# Patient Record
Sex: FEMALE | Race: WHITE | NOT HISPANIC OR LATINO
[De-identification: names, ages, dates, MRNs, and addresses within clinical notes are randomized per-mention and may not be internally consistent; named-entity substitution may affect disease eponyms.]

---

## 2019-04-05 ENCOUNTER — APPOINTMENT (OUTPATIENT)
Dept: OBGYN | Facility: CLINIC | Age: 29
End: 2019-04-05
Payer: COMMERCIAL

## 2019-04-05 VITALS
WEIGHT: 140 LBS | DIASTOLIC BLOOD PRESSURE: 70 MMHG | BODY MASS INDEX: 23.9 KG/M2 | SYSTOLIC BLOOD PRESSURE: 120 MMHG | HEIGHT: 64 IN

## 2019-04-05 DIAGNOSIS — Z80.3 FAMILY HISTORY OF MALIGNANT NEOPLASM OF BREAST: ICD-10-CM

## 2019-04-05 DIAGNOSIS — Z00.00 ENCOUNTER FOR GENERAL ADULT MEDICAL EXAMINATION W/OUT ABNORMAL FINDINGS: ICD-10-CM

## 2019-04-05 DIAGNOSIS — Z82.49 FAMILY HISTORY OF ISCHEMIC HEART DISEASE AND OTHER DISEASES OF THE CIRCULATORY SYSTEM: ICD-10-CM

## 2019-04-05 DIAGNOSIS — K63.9 DISEASE OF INTESTINE, UNSPECIFIED: ICD-10-CM

## 2019-04-05 DIAGNOSIS — Z78.9 OTHER SPECIFIED HEALTH STATUS: ICD-10-CM

## 2019-04-05 DIAGNOSIS — Z87.09 PERSONAL HISTORY OF OTHER DISEASES OF THE RESPIRATORY SYSTEM: ICD-10-CM

## 2019-04-05 PROCEDURE — 99385 PREV VISIT NEW AGE 18-39: CPT

## 2019-04-05 PROCEDURE — 99213 OFFICE O/P EST LOW 20 MIN: CPT | Mod: 25

## 2019-04-05 NOTE — PHYSICAL EXAM
[Awake] : awake [Alert] : alert [Acute Distress] : no acute distress [LAD] : no lymphadenopathy [Thyroid Nodule] : no thyroid nodule [Goiter] : no goiter [Mass] : no breast mass [Nipple Discharge] : no nipple discharge [Axillary LAD] : no axillary lymphadenopathy [Soft] : soft [Tender] : non tender [Distended] : not distended [Oriented x3] : oriented to person, place, and time [Depressed Mood] : not depressed [Flat Affect] : affect not flat [RRR, No Murmurs] : RRR, no murmurs [CTAB] : CTAB

## 2019-04-08 ENCOUNTER — LABORATORY RESULT (OUTPATIENT)
Age: 29
End: 2019-04-08

## 2019-05-08 ENCOUNTER — APPOINTMENT (OUTPATIENT)
Dept: OBGYN | Facility: CLINIC | Age: 29
End: 2019-05-08

## 2019-09-10 ENCOUNTER — ASOB RESULT (OUTPATIENT)
Age: 29
End: 2019-09-10

## 2019-09-10 ENCOUNTER — APPOINTMENT (OUTPATIENT)
Dept: OBGYN | Facility: CLINIC | Age: 29
End: 2019-09-10
Payer: COMMERCIAL

## 2019-09-10 VITALS
WEIGHT: 140 LBS | SYSTOLIC BLOOD PRESSURE: 120 MMHG | HEIGHT: 64 IN | DIASTOLIC BLOOD PRESSURE: 60 MMHG | BODY MASS INDEX: 23.9 KG/M2

## 2019-09-10 PROCEDURE — ZZZZZ: CPT

## 2019-09-10 PROCEDURE — 99213 OFFICE O/P EST LOW 20 MIN: CPT

## 2019-09-10 PROCEDURE — 76830 TRANSVAGINAL US NON-OB: CPT

## 2019-09-10 PROCEDURE — 76856 US EXAM PELVIC COMPLETE: CPT | Mod: 59

## 2019-09-12 NOTE — DISCUSSION/SUMMARY
[FreeTextEntry1] : female infertility. dw pt rba clomid incl 15 % risk twins. had us in office which showed subserosal fibroid 3+ cm at fundus. otherwise wnl. \par will try clomid, fu 1 mo.

## 2019-09-13 RX ORDER — CLOMIPHENE CITRATE 50 MG/1
50 TABLET ORAL DAILY
Qty: 5 | Refills: 0 | Status: ACTIVE | COMMUNITY
Start: 2019-09-13 | End: 1900-01-01

## 2019-09-16 LAB — CYTOLOGY CVX/VAG DOC THIN PREP: NORMAL

## 2020-09-10 ENCOUNTER — APPOINTMENT (OUTPATIENT)
Dept: OBGYN | Facility: CLINIC | Age: 30
End: 2020-09-10
Payer: COMMERCIAL

## 2020-09-10 VITALS
WEIGHT: 150 LBS | DIASTOLIC BLOOD PRESSURE: 71 MMHG | HEIGHT: 64 IN | SYSTOLIC BLOOD PRESSURE: 106 MMHG | BODY MASS INDEX: 25.61 KG/M2

## 2020-09-10 PROCEDURE — 99213 OFFICE O/P EST LOW 20 MIN: CPT

## 2020-09-29 ENCOUNTER — LABORATORY RESULT (OUTPATIENT)
Age: 30
End: 2020-09-29

## 2020-10-07 ENCOUNTER — ASOB RESULT (OUTPATIENT)
Age: 30
End: 2020-10-07

## 2020-10-07 ENCOUNTER — APPOINTMENT (OUTPATIENT)
Dept: OBGYN | Facility: CLINIC | Age: 30
End: 2020-10-07
Payer: COMMERCIAL

## 2020-10-07 ENCOUNTER — APPOINTMENT (OUTPATIENT)
Dept: OBGYN | Facility: CLINIC | Age: 30
End: 2020-10-07

## 2020-10-07 PROCEDURE — 76856 US EXAM PELVIC COMPLETE: CPT | Mod: 59

## 2020-10-07 PROCEDURE — 76830 TRANSVAGINAL US NON-OB: CPT

## 2020-10-26 ENCOUNTER — APPOINTMENT (OUTPATIENT)
Dept: OBGYN | Facility: CLINIC | Age: 30
End: 2020-10-26
Payer: COMMERCIAL

## 2020-10-26 VITALS
WEIGHT: 154 LBS | HEART RATE: 90 BPM | HEIGHT: 64 IN | SYSTOLIC BLOOD PRESSURE: 116 MMHG | DIASTOLIC BLOOD PRESSURE: 75 MMHG | BODY MASS INDEX: 26.29 KG/M2

## 2020-10-26 PROCEDURE — 99213 OFFICE O/P EST LOW 20 MIN: CPT | Mod: 25

## 2020-10-26 PROCEDURE — 99072 ADDL SUPL MATRL&STAF TM PHE: CPT

## 2020-10-26 PROCEDURE — 99395 PREV VISIT EST AGE 18-39: CPT

## 2020-10-26 NOTE — PHYSICAL EXAM
[Appropriately responsive] : appropriately responsive [Alert] : alert [No Acute Distress] : no acute distress [No Lymphadenopathy] : no lymphadenopathy [Regular Rate Rhythm] : regular rate rhythm [No Murmurs] : no murmurs [Clear to Auscultation B/L] : clear to auscultation bilaterally [Soft] : soft [Non-tender] : non-tender [Non-distended] : non-distended [No HSM] : No HSM [No Lesions] : no lesions [No Mass] : no mass [Oriented x3] : oriented x3 [Examination Of The Breasts] : a normal appearance [No Masses] : no breast masses were palpable [Labia Majora] : normal [Labia Minora] : normal [Normal] : uterus [No Bleeding] : there was no active vaginal bleeding [Enlarged ___ wks] : enlarged [unfilled] ~Uweeks [Uterine Adnexae] : were not tender and not enlarged

## 2020-10-28 LAB — HPV HIGH+LOW RISK DNA PNL CVX: NOT DETECTED

## 2020-10-29 LAB — CYTOLOGY CVX/VAG DOC THIN PREP: NORMAL

## 2021-06-01 ENCOUNTER — EMERGENCY (EMERGENCY)
Facility: HOSPITAL | Age: 31
LOS: 1 days | Discharge: HOME | End: 2021-06-01
Attending: EMERGENCY MEDICINE
Payer: COMMERCIAL

## 2021-06-01 VITALS
TEMPERATURE: 98 F | RESPIRATION RATE: 16 BRPM | OXYGEN SATURATION: 98 % | SYSTOLIC BLOOD PRESSURE: 125 MMHG | HEIGHT: 66 IN | HEART RATE: 73 BPM | WEIGHT: 149.91 LBS | DIASTOLIC BLOOD PRESSURE: 80 MMHG

## 2021-06-01 LAB — HCG UR QL: NEGATIVE — SIGNIFICANT CHANGE UP

## 2021-06-01 PROCEDURE — 81025 URINE PREGNANCY TEST: CPT

## 2021-06-01 PROCEDURE — 70450 CT HEAD/BRAIN W/O DYE: CPT | Mod: 26

## 2021-06-01 PROCEDURE — 99284 EMERGENCY DEPT VISIT MOD MDM: CPT | Mod: 25

## 2021-06-01 PROCEDURE — 72125 CT NECK SPINE W/O DYE: CPT

## 2021-06-01 PROCEDURE — 99284 EMERGENCY DEPT VISIT MOD MDM: CPT

## 2021-06-01 PROCEDURE — 70450 CT HEAD/BRAIN W/O DYE: CPT

## 2021-06-01 PROCEDURE — 72125 CT NECK SPINE W/O DYE: CPT | Mod: 26

## 2021-06-01 PROCEDURE — 96372 THER/PROPH/DIAG INJ SC/IM: CPT

## 2021-06-01 RX ORDER — METHOCARBAMOL 500 MG/1
2 TABLET, FILM COATED ORAL
Qty: 42 | Refills: 0
Start: 2021-06-01 | End: 2021-06-07

## 2021-06-01 RX ORDER — METHOCARBAMOL 500 MG/1
1500 TABLET, FILM COATED ORAL ONCE
Refills: 0 | Status: COMPLETED | OUTPATIENT
Start: 2021-06-01 | End: 2021-06-01

## 2021-06-01 RX ORDER — LIDOCAINE 4 G/100G
1 CREAM TOPICAL ONCE
Refills: 0 | Status: COMPLETED | OUTPATIENT
Start: 2021-06-01 | End: 2021-06-01

## 2021-06-01 RX ORDER — IBUPROFEN 200 MG
1 TABLET ORAL
Qty: 28 | Refills: 0
Start: 2021-06-01 | End: 2021-06-07

## 2021-06-01 RX ORDER — KETOROLAC TROMETHAMINE 30 MG/ML
30 SYRINGE (ML) INJECTION ONCE
Refills: 0 | Status: DISCONTINUED | OUTPATIENT
Start: 2021-06-01 | End: 2021-06-01

## 2021-06-01 RX ADMIN — LIDOCAINE 1 PATCH: 4 CREAM TOPICAL at 10:31

## 2021-06-01 RX ADMIN — METHOCARBAMOL 1500 MILLIGRAM(S): 500 TABLET, FILM COATED ORAL at 10:30

## 2021-06-01 RX ADMIN — Medication 30 MILLIGRAM(S): at 10:30

## 2021-06-01 NOTE — ED PROVIDER NOTE - PATIENT PORTAL LINK FT
You can access the FollowMyHealth Patient Portal offered by [facility] by registering at the following website: [email or web address]. By joining [employer]’s FollowMyHealth portal, you will also be able to view your health information using other applications (apps) compatible with our system.

## 2021-06-01 NOTE — ED PROVIDER NOTE - CARE PROVIDER_API CALL
Mukesh [place] (DO)  Orthopaedic Surgery  [address] [place] [address], [address]  [place] [address]  Phone: [phone]  Fax: [phone]  Follow Up Time:

## 2021-06-01 NOTE — ED ADULT TRIAGE NOTE - CHIEF COMPLAINT QUOTE
pt c/o neck and upper back pain nontraumatic   occasional radiation to right arm with feelings of tingling.

## 2021-06-01 NOTE — ED PROVIDER NOTE - OBJECTIVE STATEMENT
31 year old female with no significant PMh presents with neck pain., pt states that her pain started 2 days ago with a R sided neck pain, Pain is constant, radiates down the R arm. She states that this morning she had numbness and tingling in the RUE but that has resolved. She reports associated posterior throbbing headache. no blurry vision, numbness, tingling, weakness, chest pain, SOB, fevers. Denies trauma.

## 2021-12-17 ENCOUNTER — TRANSCRIPTION ENCOUNTER (OUTPATIENT)
Age: 31
End: 2021-12-17

## 2022-03-29 PROBLEM — Z78.9 OTHER SPECIFIED HEALTH STATUS: Chronic | Status: ACTIVE | Noted: 2021-06-01

## 2022-04-04 ENCOUNTER — APPOINTMENT (OUTPATIENT)
Dept: OBGYN | Facility: CLINIC | Age: 32
End: 2022-04-04
Payer: COMMERCIAL

## 2022-04-04 VITALS
BODY MASS INDEX: 26.29 KG/M2 | WEIGHT: 154 LBS | DIASTOLIC BLOOD PRESSURE: 70 MMHG | HEIGHT: 64 IN | SYSTOLIC BLOOD PRESSURE: 110 MMHG

## 2022-04-04 PROCEDURE — 99213 OFFICE O/P EST LOW 20 MIN: CPT | Mod: 25

## 2022-04-04 PROCEDURE — 99395 PREV VISIT EST AGE 18-39: CPT

## 2022-04-04 PROCEDURE — 99072 ADDL SUPL MATRL&STAF TM PHE: CPT

## 2022-04-06 LAB — HPV HIGH+LOW RISK DNA PNL CVX: NOT DETECTED

## 2022-04-11 LAB — CYTOLOGY CVX/VAG DOC THIN PREP: NORMAL

## 2022-05-09 ENCOUNTER — APPOINTMENT (OUTPATIENT)
Dept: OBGYN | Facility: CLINIC | Age: 32
End: 2022-05-09
Payer: COMMERCIAL

## 2022-05-09 ENCOUNTER — APPOINTMENT (OUTPATIENT)
Dept: ANTEPARTUM | Facility: CLINIC | Age: 32
End: 2022-05-09
Payer: COMMERCIAL

## 2022-05-09 ENCOUNTER — ASOB RESULT (OUTPATIENT)
Age: 32
End: 2022-05-09

## 2022-05-09 VITALS — WEIGHT: 154 LBS | BODY MASS INDEX: 26.29 KG/M2 | HEIGHT: 64 IN

## 2022-05-09 DIAGNOSIS — N97.9 FEMALE INFERTILITY, UNSPECIFIED: ICD-10-CM

## 2022-05-09 PROCEDURE — 99072 ADDL SUPL MATRL&STAF TM PHE: CPT

## 2022-05-09 PROCEDURE — 76830 TRANSVAGINAL US NON-OB: CPT

## 2022-05-09 PROCEDURE — 99213 OFFICE O/P EST LOW 20 MIN: CPT

## 2022-05-09 PROCEDURE — 76856 US EXAM PELVIC COMPLETE: CPT | Mod: 59

## 2022-05-09 NOTE — HISTORY OF PRESENT ILLNESS
[FreeTextEntry1] : 31-year-old white female para zero here for follow up visit. Patient has been trying for several years without success to get pregnant. In 2018 the patient had normal HSG normal semen analysis normal TSH FSH LH and serum anti muellerian hormone level's. She was seen by Dr. Ledesma of [facility] and had 2 cycles of letrozole with IUI without success. She stopped secondary to insurance issues.\par \par Patient had a known 4 cm posterior myoma and she is now here for followup visit for sonogram and discussion. Ultrasound shows a 7.8 x 4.7 cm uterus with a 6.9 cm posterior subserosal myoma. Both adnexa are normal. The endometrial lining is 20.9 mm.

## 2022-05-09 NOTE — DISCUSSION/SUMMARY
[FreeTextEntry1] : I discussed the findings of the sonogram with the patient. I discussed that location of fibroids is very important and subserosal fibroids in general are less of an issue in terms of bleeding, pain as well as infertility.\par \par Patient has not followed up for repeat blood work. Once this is back she would like to try another course of left result although she has had 2 courses of letrozole with IUI. We will await the results of the blood work to proceed.

## 2022-05-16 ENCOUNTER — NON-APPOINTMENT (OUTPATIENT)
Age: 32
End: 2022-05-16

## 2022-07-08 ENCOUNTER — LABORATORY RESULT (OUTPATIENT)
Age: 32
End: 2022-07-08

## 2022-08-03 ENCOUNTER — APPOINTMENT (OUTPATIENT)
Dept: OBGYN | Facility: CLINIC | Age: 32
End: 2022-08-03

## 2022-08-03 VITALS
DIASTOLIC BLOOD PRESSURE: 70 MMHG | BODY MASS INDEX: 26.8 KG/M2 | HEIGHT: 64 IN | SYSTOLIC BLOOD PRESSURE: 106 MMHG | WEIGHT: 157 LBS

## 2022-08-03 DIAGNOSIS — N94.10 UNSPECIFIED DYSPAREUNIA: ICD-10-CM

## 2022-08-03 PROCEDURE — 99214 OFFICE O/P EST MOD 30 MIN: CPT

## 2022-08-03 PROCEDURE — 99072 ADDL SUPL MATRL&STAF TM PHE: CPT

## 2022-08-03 NOTE — HISTORY OF PRESENT ILLNESS
[FreeTextEntry1] : 32-year-old white female  0 here for follow-up visit with complaint of pelvic pain and dyspareunia as well as some postcoital bleeding.  Patient has had normal Pap smears.  She has a known fibroid uterus with a 6 to 7 cm subserosal myoma.  She reports persistent pain with intercourse.  She also sometimes reports pain otherwise although denies any GI or  symptoms.\par \par Patient has a history of infertility and recently has had normal FSH estradiol and anti-müllerian hormone levels.  She has had 2 trials of letrozole and IUI with Dr. Ledesma in the past without success.

## 2022-08-03 NOTE — DISCUSSION/SUMMARY
[FreeTextEntry1] : I discussed the clinical situation in terms of her dyspareunia at length.  She will return for repeat sonogram although it is possible that her symptoms are secondary to her fibroid.  We discussed the option of considering a myomectomy once we have performed the sonogram.\par \par Regarding her postcoital bleeding I discussed possibility of HPV being an etiology.  Patient is HPV high-risk negative but may still be HPV low risk positive.  We discussed considering some TCA to the transformation zone and endocervical canal.  Patient will consider this and we will possibly apply when she returns for sonogram.

## 2022-08-24 ENCOUNTER — ASOB RESULT (OUTPATIENT)
Age: 32
End: 2022-08-24

## 2022-08-24 ENCOUNTER — APPOINTMENT (OUTPATIENT)
Dept: ANTEPARTUM | Facility: CLINIC | Age: 32
End: 2022-08-24

## 2022-08-24 ENCOUNTER — APPOINTMENT (OUTPATIENT)
Dept: OBGYN | Facility: CLINIC | Age: 32
End: 2022-08-24

## 2022-08-24 VITALS
DIASTOLIC BLOOD PRESSURE: 70 MMHG | SYSTOLIC BLOOD PRESSURE: 110 MMHG | HEIGHT: 64 IN | WEIGHT: 157 LBS | BODY MASS INDEX: 26.8 KG/M2

## 2022-08-24 DIAGNOSIS — N93.0 POSTCOITAL AND CONTACT BLEEDING: ICD-10-CM

## 2022-08-24 DIAGNOSIS — D25.9 LEIOMYOMA OF UTERUS, UNSPECIFIED: ICD-10-CM

## 2022-08-24 PROCEDURE — 99214 OFFICE O/P EST MOD 30 MIN: CPT

## 2022-08-24 PROCEDURE — 99072 ADDL SUPL MATRL&STAF TM PHE: CPT

## 2022-08-24 PROCEDURE — 76830 TRANSVAGINAL US NON-OB: CPT

## 2022-08-24 PROCEDURE — 76856 US EXAM PELVIC COMPLETE: CPT | Mod: 59

## 2022-08-24 RX ORDER — MISOPROSTOL 200 UG/1
200 TABLET ORAL
Qty: 2 | Refills: 0 | Status: ACTIVE | COMMUNITY
Start: 2022-08-24 | End: 1900-01-01

## 2022-08-24 NOTE — HISTORY OF PRESENT ILLNESS
[FreeTextEntry1] : This is a 32-year-old white female  0 here for follow-up visit for postcoital and intermenstrual bleeding.  She has a 6-7 cm subserosal myoma and has been seen and evaluated by [facility].  She says she had an HSG about 4 years ago.  She has had normal FSH and serum anti-müllerian hormone levels and 2 trials of letrozole without success.\par \par She is also here regarding her postcoital bleeding.  Patient's Pap smears have been normal.\par \par Ultrasound today reveals a 7.7 x 6 cm uterus with a 6.7 cm subserosal myoma.  Her endometrial thickness is 14.98.  She has normal adnexa other than the left-sided 1.6 cm corpus luteal cyst.

## 2022-08-24 NOTE — DISCUSSION/SUMMARY
[FreeTextEntry1] : I discussed the findings of the sonogram at length with the patient.  I discussed in general a subserosal myoma should not interfere with her getting pregnant.  I am obtaining another HSG.  Patient is going to be following up with Dr. Lopez of [facility].  I discussed her endometrial thickness as being somewhat thickened although I discussed that it is harder to interpret in premenopausal patient.\par \par Regarding her intermenstrual bleeding and slightly thickened endometrium patient will return for office hysteroscopy and endometrial biopsy.  All her questions were answered.\par \par Regarding her postcoital bleeding I again discussed possibility of it being secondary to low risk HPV although we do not have a culture suggesting that.  We discussed considering TCA use and patient is amicable to this.  Risks and benefits were discussed including some pain and TCA was applied to transformation zone and endocervical canal.

## 2022-10-10 ENCOUNTER — APPOINTMENT (OUTPATIENT)
Dept: OBGYN | Facility: CLINIC | Age: 32
End: 2022-10-10

## 2022-10-10 ENCOUNTER — RESULT CHARGE (OUTPATIENT)
Age: 32
End: 2022-10-10

## 2022-10-10 VITALS
SYSTOLIC BLOOD PRESSURE: 110 MMHG | DIASTOLIC BLOOD PRESSURE: 70 MMHG | HEIGHT: 64 IN | WEIGHT: 157 LBS | BODY MASS INDEX: 26.8 KG/M2

## 2022-10-10 DIAGNOSIS — N92.3 OVULATION BLEEDING: ICD-10-CM

## 2022-10-10 PROCEDURE — 99072 ADDL SUPL MATRL&STAF TM PHE: CPT

## 2022-10-10 PROCEDURE — 58558Z: CUSTOM

## 2022-10-10 PROCEDURE — 81025 URINE PREGNANCY TEST: CPT

## 2022-10-10 NOTE — PROCEDURE
[Endometrial Biopsy] : Endometrial biopsy [Irregular Bleeding] : irregular uterine bleeding [Tenaculum] : Tenaculum [Required Dilation] : required dilation [Sounded to ___ cm] : sounded to [unfilled] ~Ucm [Scant] : scant [No Complications] : No complications [Hysteroscopy] : Hysteroscopy [Abnormal uterine bleeding] : abnormal uterine bleeding [Risks] : risks [Benefits] : benefits [Infection] : infection [Bleeding] : bleeding [Pretreatment with misoprostol] : pretreatment with misoprostol [Lidocaine___ mL] : [unfilled] ~UmL of lidocaine [flexible] : Using aseptic technique a hysteroscopy was performed using a flexible hysteroscope [Sent to Pathology] : specimen was placed in buffered formalin and sent for pathology [Tolerated Well] : Patient tolerated the procedure well [de-identified] : no evidence of submucosal myomas or polyps

## 2022-10-10 NOTE — HISTORY OF PRESENT ILLNESS
[FreeTextEntry1] : 32-year-old white female G0 with history of postcoital and intermenstrual bleeding and known 6 to 7 cm subserosal myoma presents for office hysteroscopy with endometrial biopsy.  Endometrial thickness was 14.98 mm.

## 2022-10-10 NOTE — DISCUSSION/SUMMARY
[FreeTextEntry1] : I discussed the findings of the office hysteroscopy and endometrial biopsy with the patient we will await the results.  I did discuss that patient is going to follow-up with [facility] in terms of her substantial fibroid although it appears to be subserosal in nature.  All her questions were answered.

## 2022-10-11 LAB — HCG UR QL: NEGATIVE

## 2022-10-13 LAB — CORE LAB BIOPSY: NORMAL

## 2024-03-07 ENCOUNTER — APPOINTMENT (OUTPATIENT)
Dept: OBGYN | Facility: CLINIC | Age: 34
End: 2024-03-07
Payer: COMMERCIAL

## 2024-03-07 VITALS
BODY MASS INDEX: 25.61 KG/M2 | HEIGHT: 64 IN | WEIGHT: 150 LBS | DIASTOLIC BLOOD PRESSURE: 75 MMHG | SYSTOLIC BLOOD PRESSURE: 114 MMHG

## 2024-03-07 DIAGNOSIS — Z01.419 ENCOUNTER FOR GYNECOLOGICAL EXAMINATION (GENERAL) (ROUTINE) W/OUT ABNORMAL FINDINGS: ICD-10-CM

## 2024-03-07 PROCEDURE — 99395 PREV VISIT EST AGE 18-39: CPT

## 2024-03-07 NOTE — DISCUSSION/SUMMARY
[FreeTextEntry1] : Pap smear is performed.  Patient is going to follow-up with [facility] in 2 months to commence trying to get pregnant.  Because of a history of fibroids and recent myomectomy she will return for sonogram.  We also discussed genetic testing because of her maternal grandmother with premenopausal breast cancer.

## 2024-03-07 NOTE — PHYSICAL EXAM
[Chaperone Present] : A chaperone was present in the examining room during all aspects of the physical examination [FreeTextEntry1] : nayana cabrera [Appropriately responsive] : appropriately responsive [Alert] : alert [No Acute Distress] : no acute distress [No Lymphadenopathy] : no lymphadenopathy [Regular Rate Rhythm] : regular rate rhythm [Clear to Auscultation B/L] : clear to auscultation bilaterally [No Murmurs] : no murmurs [Soft] : soft [Non-tender] : non-tender [Non-distended] : non-distended [No HSM] : No HSM [No Lesions] : no lesions [Oriented x3] : oriented x3 [No Mass] : no mass [Examination Of The Breasts] : a normal appearance [No Masses] : no breast masses were palpable [Labia Majora] : normal [Labia Minora] : normal [Normal] : normal [Uterine Adnexae] : normal

## 2024-03-07 NOTE — HISTORY OF PRESENT ILLNESS
[FreeTextEntry1] : 33-year-old white female G0 here for annual visit.  She is sexually active with her  and is trying to get pregnant.  Is followed by reproductive endocrine in [place].    Patient did have a history of a fibroid uterus with a 7 cm fibroid that she just had removed at [facility].  It was removed robotically.  The surgeon advised her to wait 6 months prior to pregnancy.  She was also advised that she had severe cervical stenosis because they were not able to perform chromotubation at the time of the surgery.  Otherwise she has a medical history of asthma, surgical history of rhinoplasty as well as myomectomy.  She is .  Denies tobacco or drug use but drinks occasionally.  Patient is a surgical tech at [employer].  She has a family history of premenopausal breast cancer in maternal grandmother

## 2024-03-10 LAB — HPV HIGH+LOW RISK DNA PNL CVX: NOT DETECTED

## 2024-03-12 LAB — CYTOLOGY CVX/VAG DOC THIN PREP: NORMAL

## 2024-04-05 ENCOUNTER — APPOINTMENT (OUTPATIENT)
Dept: ANTEPARTUM | Facility: CLINIC | Age: 34
End: 2024-04-05

## 2024-04-08 ENCOUNTER — APPOINTMENT (OUTPATIENT)
Dept: OBGYN | Facility: CLINIC | Age: 34
End: 2024-04-08

## 2024-12-03 ENCOUNTER — APPOINTMENT (OUTPATIENT)
Dept: OBGYN | Facility: CLINIC | Age: 34
End: 2024-12-03

## 2025-09-04 ENCOUNTER — APPOINTMENT (OUTPATIENT)
Dept: OTOLARYNGOLOGY | Facility: CLINIC | Age: 35
End: 2025-09-04
Payer: COMMERCIAL

## 2025-09-04 ENCOUNTER — NON-APPOINTMENT (OUTPATIENT)
Age: 35
End: 2025-09-04

## 2025-09-04 VITALS
SYSTOLIC BLOOD PRESSURE: 89 MMHG | HEART RATE: 81 BPM | WEIGHT: 170 LBS | DIASTOLIC BLOOD PRESSURE: 65 MMHG | HEIGHT: 64 IN | BODY MASS INDEX: 29.02 KG/M2

## 2025-09-04 DIAGNOSIS — J34.89 OTHER SPECIFIED DISORDERS OF NOSE AND NASAL SINUSES: ICD-10-CM

## 2025-09-04 DIAGNOSIS — K21.9 GASTRO-ESOPHAGEAL REFLUX DISEASE W/OUT ESOPHAGITIS: ICD-10-CM

## 2025-09-04 DIAGNOSIS — R09.82 POSTNASAL DRIP: ICD-10-CM

## 2025-09-04 DIAGNOSIS — J34.2 DEVIATED NASAL SEPTUM: ICD-10-CM

## 2025-09-04 DIAGNOSIS — R09.81 NASAL CONGESTION: ICD-10-CM

## 2025-09-04 PROCEDURE — 31231 NASAL ENDOSCOPY DX: CPT

## 2025-09-04 PROCEDURE — 99204 OFFICE O/P NEW MOD 45 MIN: CPT | Mod: 25

## 2025-09-04 RX ORDER — AZELASTINE HYDROCHLORIDE 137 UG/1
0.1 SPRAY, METERED NASAL
Qty: 1 | Refills: 3 | Status: ACTIVE | COMMUNITY
Start: 2025-09-04 | End: 1900-01-01

## 2025-09-04 RX ORDER — FLUTICASONE PROPIONATE 50 UG/1
50 SPRAY NASAL DAILY
Qty: 1 | Refills: 3 | Status: ACTIVE | COMMUNITY
Start: 2025-09-04 | End: 1900-01-01